# Patient Record
Sex: FEMALE
[De-identification: names, ages, dates, MRNs, and addresses within clinical notes are randomized per-mention and may not be internally consistent; named-entity substitution may affect disease eponyms.]

---

## 2018-08-23 PROBLEM — Z00.00 ENCOUNTER FOR PREVENTIVE HEALTH EXAMINATION: Status: ACTIVE | Noted: 2018-08-23

## 2018-09-20 ENCOUNTER — APPOINTMENT (OUTPATIENT)
Dept: ENDOCRINOLOGY | Facility: CLINIC | Age: 48
End: 2018-09-20
Payer: MEDICAID

## 2018-09-20 ENCOUNTER — TRANSCRIPTION ENCOUNTER (OUTPATIENT)
Age: 48
End: 2018-09-20

## 2018-09-20 VITALS
SYSTOLIC BLOOD PRESSURE: 151 MMHG | DIASTOLIC BLOOD PRESSURE: 85 MMHG | HEART RATE: 106 BPM | HEIGHT: 62 IN | BODY MASS INDEX: 34.78 KG/M2 | WEIGHT: 189 LBS

## 2018-09-20 DIAGNOSIS — Z87.891 PERSONAL HISTORY OF NICOTINE DEPENDENCE: ICD-10-CM

## 2018-09-20 DIAGNOSIS — Z83.3 FAMILY HISTORY OF DIABETES MELLITUS: ICD-10-CM

## 2018-09-20 DIAGNOSIS — Z82.0 FAMILY HISTORY OF EPILEPSY AND OTHER DISEASES OF THE NERVOUS SYSTEM: ICD-10-CM

## 2018-09-20 PROCEDURE — 99204 OFFICE O/P NEW MOD 45 MIN: CPT

## 2018-09-24 ENCOUNTER — LABORATORY RESULT (OUTPATIENT)
Age: 48
End: 2018-09-24

## 2018-09-26 ENCOUNTER — TRANSCRIPTION ENCOUNTER (OUTPATIENT)
Age: 48
End: 2018-09-26

## 2018-09-26 LAB
CORTIS SERPL-MCNC: 0.7 UG/DL
HBA1C MFR BLD HPLC: 4.6 %
THYROGLOB AB SERPL IA-ACNC: <1.8 IU/ML

## 2018-09-27 RX ORDER — DEXAMETHASONE 1 MG/1
1 TABLET ORAL
Qty: 1 | Refills: 0 | Status: DISCONTINUED | COMMUNITY
Start: 2018-09-20 | End: 2018-09-27

## 2018-10-25 ENCOUNTER — APPOINTMENT (OUTPATIENT)
Dept: ENDOCRINOLOGY | Facility: CLINIC | Age: 48
End: 2018-10-25

## 2018-12-20 ENCOUNTER — RX RENEWAL (OUTPATIENT)
Age: 48
End: 2018-12-20

## 2019-10-28 ENCOUNTER — TRANSCRIPTION ENCOUNTER (OUTPATIENT)
Age: 49
End: 2019-10-28

## 2019-12-19 ENCOUNTER — APPOINTMENT (OUTPATIENT)
Dept: ENDOCRINOLOGY | Facility: CLINIC | Age: 49
End: 2019-12-19
Payer: MEDICAID

## 2019-12-19 VITALS
WEIGHT: 198 LBS | HEIGHT: 62 IN | HEART RATE: 86 BPM | SYSTOLIC BLOOD PRESSURE: 141 MMHG | DIASTOLIC BLOOD PRESSURE: 90 MMHG | BODY MASS INDEX: 36.44 KG/M2

## 2019-12-19 PROCEDURE — 99214 OFFICE O/P EST MOD 30 MIN: CPT

## 2019-12-20 LAB
ALBUMIN SERPL ELPH-MCNC: 4.2 G/DL
ALP BLD-CCNC: 87 U/L
ALT SERPL-CCNC: 18 U/L
ANION GAP SERPL CALC-SCNC: 13 MMOL/L
AST SERPL-CCNC: 14 U/L
BILIRUB SERPL-MCNC: 0.5 MG/DL
BUN SERPL-MCNC: 18 MG/DL
CALCIUM SERPL-MCNC: 9.3 MG/DL
CHLORIDE SERPL-SCNC: 104 MMOL/L
CO2 SERPL-SCNC: 24 MMOL/L
CREAT SERPL-MCNC: 0.64 MG/DL
ESTIMATED AVERAGE GLUCOSE: 88 MG/DL
GLUCOSE SERPL-MCNC: 100 MG/DL
HBA1C MFR BLD HPLC: 4.7 %
POTASSIUM SERPL-SCNC: 4.3 MMOL/L
PROT SERPL-MCNC: 6.8 G/DL
SODIUM SERPL-SCNC: 141 MMOL/L
THYROGLOB AB SERPL-ACNC: <20 IU/ML
THYROGLOB SERPL-MCNC: <0.2 NG/ML
TSH SERPL-ACNC: 3.35 UIU/ML

## 2020-01-06 ENCOUNTER — RX RENEWAL (OUTPATIENT)
Age: 50
End: 2020-01-06

## 2020-01-06 NOTE — ASSESSMENT
[FreeTextEntry1] : Thyroid cancer. She had a history of nontoxic multinodular goiter, with biopsy of one lesion showing papillary thyroid cancer. She is status post total thyroidectomy in 2015. Pathology showed papillary thyroid carcinoma in one lobe and follicular thyroid carcinoma in the contralateral lobe. She did not need I-131 therapy. She states subsequent testing has demonstrated no evidence of disease. Her surgery and subsequent management prior to moving back to [facility] were at [facility]. She has been on Levoxyl 137 mcg daily.\par Request prior records\par Continue Levoxyl 137 mcg daily pending TSH for goal 0.5-2.0 uIU/mL\par Check thyroglobulin and antibodies\par Check neck ultrasound \par \par Elevated body mass index. Comorbidity of obstructive sleep apnea. We reviewed lifestyle modifications for weight loss. We discussed referral to nutrition. We discussed pharmacologic options for weight loss. She is amenable to bupropion. We discussed the risks and benefits of bupropion, including but not limited to nausea, headache, constipation, decrease in the seizure threshold. \par Lifestyle modification\par Referral to nutrition \par Start bupropion  mg daily for one week, then 300 mg daily\par \par Return to clinic in 2 months.\par \par CC:\par Dr. Mike Burnham, Fax [phone]

## 2020-01-06 NOTE — PHYSICAL EXAM
[Healthy Appearance] : healthy appearance [Alert] : alert [No Acute Distress] : no acute distress [Normal Sclera/Conjunctiva] : normal sclera/conjunctiva [No Neck Mass] : no neck mass was observed [Normal Oropharynx] : the oropharynx was normal [Supple] : the neck was supple [No LAD] : no lymphadenopathy [Well Healed Scar] : well healed scar [Normal Rate and Effort] : normal respiratory rhythm and effort [Clear to Auscultation] : lungs were clear to auscultation bilaterally [Normal Rate] : heart rate was normal  [Normal S1, S2] : normal S1 and S2 [Regular Rhythm] : with a regular rhythm [No Edema] : there was no peripheral edema [No Stigmata of Cushings Syndrome] : no stigmata of cushings syndrome [Acanthosis Nigricans] : acanthosis nigricans [Normal Insight/Judgement] : insight and judgment were intact [Kyphosis] : no kyphosis present [de-identified] : no palpable thyroid tissue [de-identified] : no moon facies, no supraclavicular fat pads

## 2020-01-06 NOTE — DATA REVIEWED
[FreeTextEntry1] : Recent laboratory testing from August reviewed, with unremarkable complete blood count and comprehensive metabolic panel, and TSH 0.123 uIU/mL

## 2020-01-06 NOTE — ADDENDUM
[FreeTextEntry1] : Recent test results as below; discussed with Ms. Rowe. Thyroglobulin undetectable with negative antibodies, which is reassuring. TSH 3.35 uIU/mL and recommend adjustment in thyroid regimen for goal TSH 0.5-2.0 uIU/mL. She is feeling fatigued and wants a trial of combination levothyroxine + liothyronine therapy. We will continue Levoxyl 137 mcg daily and add liothyronine 5 mcg daily in the afternoon. Other test results within range. We will repeat TSH prior to her next visit. 12/20/19\par \par Ms. Rowe has had migraine headaches with liothyronine generic and wonders if a switch to brand-name may help. I sent the prescription for Cytomel brand 5 mcg daily to her pharmacy. 1/06/20

## 2020-01-06 NOTE — HISTORY OF PRESENT ILLNESS
[FreeTextEntry1] : Ms. Rowe is a 49 year-old woman with a history of thyroid cancer status post total thyroidectomy in 2015, elevated body mass index with comorbidity of obstructive sleep apnea presenting for follow-up. I saw her for an initial visit in September 2018.\par \par Thyroid cancer.\par She had a history of nontoxic multinodular goiter, with biopsy of one lesion showing papillary thyroid cancer. She is status post total thyroidectomy in 2015. Pathology showed papillary thyroid carcinoma in one lobe and follicular thyroid carcinoma in the contralateral lobe. She did not need I-131 therapy. She states subsequent testing has demonstrated no evidence of disease. Her surgery and subsequent management prior to moving back to [place] were at [facility].\par She was initially on Levoxyl (brand-name) 175 mcg daily, decreased to 137 mcg daily after a few months. She has been on this dose for the past year.\par No history of radiation exposure.\par No family history of thyroid cancer.\par \par Interim History \par Laboratory results from last visit as below. Thyroglobulin undetectable with negative antibodies. Morning cortisol appropriately suppressed after dexamethasone. HbA1c within range.  \par She just returned from a year in [place]. She was caring for her disabled child, but is now moving here for services.\par She was on Celebrex, with subsequent weight gain.\par She was following the radical metabolism diet. She was also on a very low carbohydrate diet. \par She has been studying and raising three children. 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 \par She has neck pain. She has left foot pain and will have podiatric surgery. She was diagnosed with a kidney cyst and is having further imaging. \par Medical and surgical history, medications, allergies, social and family history reviewed and updated as needed.

## 2020-01-08 ENCOUNTER — RX RENEWAL (OUTPATIENT)
Age: 50
End: 2020-01-08

## 2020-02-12 ENCOUNTER — APPOINTMENT (OUTPATIENT)
Dept: ENDOCRINOLOGY | Facility: CLINIC | Age: 50
End: 2020-02-12
Payer: COMMERCIAL

## 2020-02-12 VITALS
SYSTOLIC BLOOD PRESSURE: 125 MMHG | HEART RATE: 86 BPM | BODY MASS INDEX: 35.48 KG/M2 | WEIGHT: 194 LBS | DIASTOLIC BLOOD PRESSURE: 80 MMHG

## 2020-02-12 PROCEDURE — 99214 OFFICE O/P EST MOD 30 MIN: CPT

## 2020-02-12 PROCEDURE — 97802 MEDICAL NUTRITION INDIV IN: CPT

## 2020-02-12 NOTE — HISTORY OF PRESENT ILLNESS
[FreeTextEntry1] : Ms. Rowe is a 49 year-old woman with a history of thyroid cancer status post total thyroidectomy in 2015, elevated body mass index with comorbidity of obstructive sleep apnea presenting for follow-up of her endocrine issues. I saw her for an initial visit in September 2018 and last in December 2019.\par \par Thyroid cancer.\par She had a history of nontoxic multinodular goiter, with biopsy of one lesion showing papillary thyroid cancer. She is status post total thyroidectomy in 2015. Pathology showed papillary thyroid carcinoma in one lobe and follicular thyroid carcinoma in the contralateral lobe. She did not need I-131 therapy. She states subsequent testing has demonstrated no evidence of disease. Her surgery and subsequent management prior to moving back to [place] were at [facility].\par She was on a regimen of Levoxyl 137 mcg daily. We started Cytomel 5 mcg daily in December 2019. She did not tolerate generic liothyronine. \par She is taking Levoxyl in the morning, on an empty stomach, with plain water, and waiting at least 30 minutes before eating. She is not taking calcium/iron/multivitamin. She is taking Cytomel in the early afternoon. \par No history of radiation exposure.\par No family history of thyroid cancer.\par \par Interim History \par Laboratory results from last visit as below. Thyroglobulin undetectable with negative antibodies. TSH 3.35 uIU/mL and recommended adjustment in thyroid regimen for goal TSH 0.5-2.0 uIU/mL. She wanted a trial of combination levothyroxine + liothyronine therapy. We continued Levoxyl 137 mcg daily and added Cytomel 5 mcg daily in the afternoon. Other test results within range. \par Last visit we discussed starting bupropion for weight loss. She did not start and wants to hold off on therapy for now. \par She was following the radical metabolism diet. She has increased physical activity. She is scheduled to see [employer]/nutrition today. \par She has been studying and raising three children. 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 She needs to travel back to [place] for 6-8 weeks.\par She has neck pain. She has left foot pain and will have podiatric surgery. She was diagnosed with a kidney cyst and is having further imaging. She has lost 4 pounds since last visit.\par Medical and surgical history, medications, allergies, social and family history reviewed and updated as needed.

## 2020-02-12 NOTE — ASSESSMENT
[FreeTextEntry1] : Thyroid cancer. She had a history of nontoxic multinodular goiter, with biopsy of one lesion showing papillary thyroid cancer. She is status post total thyroidectomy in 2015. Pathology showed papillary thyroid carcinoma in one lobe and follicular thyroid carcinoma in the contralateral lobe. She did not need I-131 therapy. She states subsequent testing has demonstrated no evidence of disease. Her surgery and subsequent management prior to moving back to [facility] were at [facility]. Thyroglobulin was undetectable with negative antibodies in December 2019, which is reassuring. We adjusted her thyroid hormone regimen last visit.\par Request prior records\par Continue Levoxyl 137 mcg + Cytomel 5 mcg daily pending TSH for goal 0.5-2.0 uIU/mL\par Check neck ultrasound\par \par Elevated body mass index. Comorbidity of obstructive sleep apnea. We reviewed lifestyle modifications for weight loss. We discussed referral to nutrition. We discussed pharmacologic options for weight loss. She may be amenable to bupropion if weight loss plateaus. We discussed the risks and benefits of bupropion, including but not limited to nausea, headache, constipation, decrease in the seizure threshold. \par Lifestyle modification\par Referral to nutrition \par \par Return to clinic in 3 months.\par \par CC:\par Dr. Mike Burnham, Fax [phone]

## 2020-02-12 NOTE — PHYSICAL EXAM
[Alert] : alert [No Acute Distress] : no acute distress [Healthy Appearance] : healthy appearance [Normal Sclera/Conjunctiva] : normal sclera/conjunctiva [Normal Oropharynx] : the oropharynx was normal [No Neck Mass] : no neck mass was observed [Supple] : the neck was supple [No LAD] : no lymphadenopathy [Well Healed Scar] : well healed scar [Normal Rate and Effort] : normal respiratory rhythm and effort [Clear to Auscultation] : lungs were clear to auscultation bilaterally [Normal Rate] : heart rate was normal  [Normal S1, S2] : normal S1 and S2 [Regular Rhythm] : with a regular rhythm [No Edema] : there was no peripheral edema [No Stigmata of Cushings Syndrome] : no stigmata of cushings syndrome [Normal Insight/Judgement] : insight and judgment were intact [Acanthosis Nigricans] : acanthosis nigricans [Kyphosis] : no kyphosis present [de-identified] : no palpable thyroid tissue [de-identified] : no moon facies, no supraclavicular fat pads

## 2020-08-17 ENCOUNTER — TRANSCRIPTION ENCOUNTER (OUTPATIENT)
Age: 50
End: 2020-08-17

## 2020-08-17 RX ORDER — LIOTHYRONINE SODIUM 5 UG/1
5 TABLET ORAL
Qty: 90 | Refills: 1 | Status: DISCONTINUED | COMMUNITY
Start: 2019-12-20 | End: 2020-08-17

## 2020-08-24 ENCOUNTER — TRANSCRIPTION ENCOUNTER (OUTPATIENT)
Age: 50
End: 2020-08-24

## 2021-02-15 ENCOUNTER — RX RENEWAL (OUTPATIENT)
Age: 51
End: 2021-02-15

## 2021-05-06 ENCOUNTER — RX RENEWAL (OUTPATIENT)
Age: 51
End: 2021-05-06

## 2021-06-18 ENCOUNTER — RX RENEWAL (OUTPATIENT)
Age: 51
End: 2021-06-18

## 2021-12-30 ENCOUNTER — APPOINTMENT (OUTPATIENT)
Dept: ENDOCRINOLOGY | Facility: CLINIC | Age: 51
End: 2021-12-30
Payer: MEDICAID

## 2021-12-30 PROCEDURE — 99214 OFFICE O/P EST MOD 30 MIN: CPT | Mod: 95

## 2021-12-30 RX ORDER — CHROMIUM 200 MCG
TABLET ORAL
Refills: 0 | Status: DISCONTINUED | COMMUNITY
End: 2021-12-30

## 2021-12-30 RX ORDER — BUPROPION HYDROCHLORIDE 300 MG/1
300 TABLET, EXTENDED RELEASE ORAL DAILY
Qty: 90 | Refills: 1 | Status: DISCONTINUED | COMMUNITY
Start: 2019-12-19 | End: 2021-12-30

## 2021-12-30 RX ORDER — BLUE-GREEN ALGAE 500 MG
CAPSULE ORAL
Refills: 0 | Status: ACTIVE | COMMUNITY

## 2021-12-30 RX ORDER — CHLORHEXIDINE GLUCONATE 4 %
LIQUID (ML) TOPICAL
Refills: 0 | Status: DISCONTINUED | COMMUNITY
End: 2021-12-30

## 2021-12-30 RX ORDER — MULTIVITAMIN
TABLET ORAL
Refills: 0 | Status: DISCONTINUED | COMMUNITY
End: 2021-12-30

## 2021-12-30 RX ORDER — CALCIUM CARBONATE/VITAMIN D3 600 MG-10
TABLET ORAL
Refills: 0 | Status: DISCONTINUED | COMMUNITY
End: 2021-12-30

## 2021-12-30 RX ORDER — PERPHENAZINE 8 MG
TABLET ORAL
Refills: 0 | Status: ACTIVE | COMMUNITY

## 2021-12-30 NOTE — ASSESSMENT
[FreeTextEntry1] : Thyroid cancer. Postoperative hypothyroidism. She had a history of nontoxic multinodular goiter, with biopsy of one lesion showing papillary thyroid cancer. She is status post total thyroidectomy in 2015. Pathology showed papillary thyroid carcinoma in one lobe and follicular thyroid carcinoma in the contralateral lobe. She did not need I-131 therapy. She states subsequent testing has demonstrated no evidence of disease. Her surgery and subsequent management prior to moving back to [facility] were at [facility]. Thyroglobulin was undetectable with negative antibodies in December 2019, which is reassuring. She has been clinically euthyroid.\par Continue Levoxyl 137 mcg daily pending TSH for goal 0.5-2.0 uIU/mL\par Check thyroglobulin and antibodies\par Review recent neck ultrasound results

## 2021-12-30 NOTE — HISTORY OF PRESENT ILLNESS
[Home] : at home, [unfilled] , at the time of the visit. [Medical Office: ([facility])___] : at the medical office located in  [Verbal consent obtained from patient] : the patient, [unfilled] [FreeTextEntry1] : Ms. Rowe is a 51 year-old woman with a history of thyroid cancer status post total thyroidectomy in 2015, elevated body mass index with comorbidity of obstructive sleep apnea presenting for follow-up of her endocrine issues. I saw her for an initial visit in September 2018 and last in February 2020.\par \par Thyroid cancer.\par She had a history of nontoxic multinodular goiter, with biopsy of one lesion showing papillary thyroid cancer. She is status post total thyroidectomy in 2015. Pathology showed papillary thyroid carcinoma in one lobe and follicular thyroid carcinoma in the contralateral lobe. She did not need I-131 therapy. She states subsequent testing has demonstrated no evidence of disease. Her surgery and subsequent management prior to moving back to [place] were at [facility].\par She has been taking Levoxyl 137 mcg daily. \par She is taking levothyroxine in the morning, on an empty stomach, with plain water, and waiting at least 30 minutes before eating. She is not taking calcium/iron/multivitamin. \par No history of radiation exposure.\par No family history of thyroid cancer.\par \par Interim History \par She has been in [place] during the pandemic and returned this week. Her daughter tested positive for COVID-19 infection on their return. \par She had a neck ultrasound in [place] and will send me the results. \par She has been studying and raising three children. \par She has had upper respiratory symptoms, although she has tested negative for COVID-19 infection. She is quarantining. She is taking Fusion hair tonic for hair loss.\par Medical and surgical history, medications, allergies, social and family history reviewed and updated as needed.

## 2022-02-17 ENCOUNTER — RX RENEWAL (OUTPATIENT)
Age: 52
End: 2022-02-17

## 2022-03-01 ENCOUNTER — APPOINTMENT (OUTPATIENT)
Dept: ENDOCRINOLOGY | Facility: CLINIC | Age: 52
End: 2022-03-01

## 2022-03-09 ENCOUNTER — APPOINTMENT (OUTPATIENT)
Dept: ENDOCRINOLOGY | Facility: CLINIC | Age: 52
End: 2022-03-09
Payer: MEDICAID

## 2022-03-09 PROCEDURE — 99214 OFFICE O/P EST MOD 30 MIN: CPT | Mod: 95

## 2022-03-09 NOTE — ASSESSMENT
[FreeTextEntry1] : Thyroid cancer. Postoperative hypothyroidism. She had a history of nontoxic multinodular goiter, with biopsy of one lesion showing papillary thyroid cancer. She is status post total thyroidectomy in 2015. Pathology showed papillary thyroid carcinoma in one lobe and follicular thyroid carcinoma in the contralateral lobe. She did not need I-131 therapy. Her surgery and subsequent management prior to moving back to [facility] were at [facility]. Thyroglobulin was undetectable in January 2022, which is reassuring. She has been biochemically euthyroid.\par Continue Levoxyl 137 mcg daily for goal TSH 0.5-2.0 uIU/mL\par Review neck ultrasound results\par \par CC:\par Dr. Lucille Major, Fax [phone]

## 2022-03-09 NOTE — HISTORY OF PRESENT ILLNESS
[FreeTextEntry1] : Ms. Rowe is a 51 year-old woman with a history of thyroid cancer status post total thyroidectomy in 2015, elevated body mass index with comorbidity of obstructive sleep apnea presenting for follow-up of her endocrine issues. I saw her for an initial visit in September 2018 and last in December 2021.\par \par Thyroid cancer.\par She had a history of nontoxic multinodular goiter, with biopsy of one lesion showing papillary thyroid cancer. She is status post total thyroidectomy in 2015. Pathology showed papillary thyroid carcinoma in one lobe and follicular thyroid carcinoma in the contralateral lobe. She did not need I-131 therapy. She states subsequent testing has demonstrated no evidence of disease. Her surgery and subsequent management prior to moving back to [facility] were at [facility].\par She has been taking Levoxyl 137 mcg daily. \par She is taking levothyroxine in the morning, on an empty stomach, with plain water, and waiting at least 30 minutes before eating. She is not taking calcium/iron/multivitamin. \par No history of radiation exposure.\par No family history of thyroid cancer.\par \par Interim History \par She has blood tests in January 2022 through her primary care provider. TSH 0.538 uIU/mL. Thyroglobulin undetectable; antibodies not sent but have been negative. She will send me results. \par She is scheduled for neck ultrasound. \par She has been raising three children. 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 Weight is overall stable from previous. Physical activity has been lower than previous. \par Medical and surgical history, medications, allergies, social and family history reviewed and updated as needed.  [Home] : at home, [unfilled] , at the time of the visit. [Medical Office: ([facility])___] : at the medical office located in  [Verbal consent obtained from patient] : the patient, [unfilled]

## 2022-03-09 NOTE — PHYSICAL EXAM
[Alert] : alert [Healthy Appearance] : healthy appearance [No Acute Distress] : no acute distress [Normal Sclera/Conjunctiva] : normal sclera/conjunctiva [Normal Hearing] : hearing was normal [Well Healed Scar] : well healed scar [No Respiratory Distress] : no respiratory distress [Normal Insight/Judgement] : insight and judgment were intact

## 2022-03-18 ENCOUNTER — TRANSCRIPTION ENCOUNTER (OUTPATIENT)
Age: 52
End: 2022-03-18

## 2022-04-21 ENCOUNTER — APPOINTMENT (OUTPATIENT)
Dept: ENDOCRINOLOGY | Facility: CLINIC | Age: 52
End: 2022-04-21
Payer: MEDICAID

## 2022-04-21 PROCEDURE — 99214 OFFICE O/P EST MOD 30 MIN: CPT | Mod: 95

## 2022-04-21 NOTE — DATA REVIEWED
[FreeTextEntry1] : Laboratories (March 25, 2022) reviewed and significant for: \par TSH 0.633 uIU/mL \par \par Laboratories (January 25, 2022) reviewed and significant for: \par TSH 0.538 uIU/mL \par Thyroglobulin undetectable\par \par Thyroid ultrasound (March 11, 2022) reviewed and significant for: \par Status post thyroidectomy. No suspicious findings.

## 2022-04-21 NOTE — HISTORY OF PRESENT ILLNESS
[Home] : at home, [unfilled] , at the time of the visit. [Medical Office: ([facility])___] : at the medical office located in  [Verbal consent obtained from patient] : the patient, [unfilled] [FreeTextEntry1] : Ms. Rowe is a 51 year-old woman with a history of thyroid cancer status post total thyroidectomy in 2015, elevated body mass index with comorbidity of obstructive sleep apnea presenting for follow-up of her endocrine issues. I saw her for an initial visit in September 2018 and last in March 2022.\par \par Thyroid cancer.\par She had a history of nontoxic multinodular goiter, with biopsy of one lesion showing papillary thyroid cancer. She is status post total thyroidectomy in 2015. Pathology showed papillary thyroid carcinoma in one lobe and follicular thyroid carcinoma in the contralateral lobe. She did not need I-131 therapy. She states subsequent testing has demonstrated no evidence of disease. Her surgery and subsequent management prior to moving back to [facility] were at [facility].\par Thyroglobulin undetectable in January 2022; antibodies not measured at that time but have been negative. Neck ultrasound without evidence of residual or recurrent disease in March 2022.\par She has been taking Levoxyl 137 mcg daily. \par She is taking levothyroxine in the morning, on an empty stomach, with plain water, and waiting at least 30 minutes before eating. She is not taking calcium/iron/multivitamin. \par No history of radiation exposure.\par No family history of thyroid cancer.\par \par Interim History \par Neck ultrasound without evidence of residual or recurrent disease in March.\par Recent TSH 0.633 uIU/mL in March.\par She had an endometrial biopsy performed this week due to postmenopausal bleeding. Her last menstrual period was in 2019 after taking evening primrose oil. She had questions about postmenopausal estrogen levels; her level was recently measured and detectable but at the lower end of the menopausal range.\par She has been raising three children. 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 \par Medical and surgical history, medications, allergies, social and family history reviewed and updated as needed.

## 2022-04-21 NOTE — ASSESSMENT
[FreeTextEntry1] : Thyroid cancer. Postoperative hypothyroidism. She had a history of nontoxic multinodular goiter, with biopsy of one lesion showing papillary thyroid cancer. She is status post total thyroidectomy in 2015. Pathology showed papillary thyroid carcinoma in one lobe and follicular thyroid carcinoma in the contralateral lobe. She did not need I-131 therapy. Her surgery and subsequent management prior to moving back to [facility] were at [facility]. Thyroglobulin undetectable in January 2022; antibodies not measured at that time but have been negative. Neck ultrasound without evidence of residual or recurrent disease in March 2022.\par Continue Levoxyl 137 mcg daily for goal TSH 0.5-2.0 uIU/mL\par Interval thyroglobulin with antibodies and neck ultrasound in March 2023\par \par CC:\par Dr. Lucille Major, Fax [phone]

## 2023-04-18 ENCOUNTER — APPOINTMENT (OUTPATIENT)
Dept: ENDOCRINOLOGY | Facility: CLINIC | Age: 53
End: 2023-04-18
Payer: MEDICAID

## 2023-04-18 VITALS
HEART RATE: 93 BPM | SYSTOLIC BLOOD PRESSURE: 121 MMHG | BODY MASS INDEX: 36.58 KG/M2 | DIASTOLIC BLOOD PRESSURE: 84 MMHG | WEIGHT: 200 LBS

## 2023-04-18 DIAGNOSIS — C73 MALIGNANT NEOPLASM OF THYROID GLAND: ICD-10-CM

## 2023-04-18 DIAGNOSIS — E89.0 POSTPROCEDURAL HYPOTHYROIDISM: ICD-10-CM

## 2023-04-18 PROCEDURE — 99214 OFFICE O/P EST MOD 30 MIN: CPT

## 2023-04-18 RX ORDER — NALTREXONE HYDROCHLORIDE 50 MG/1
TABLET, FILM COATED ORAL
Refills: 0 | Status: ACTIVE | COMMUNITY

## 2023-04-18 NOTE — PHYSICAL EXAM
[Alert] : alert [Healthy Appearance] : healthy appearance [No Acute Distress] : no acute distress [Normal Sclera/Conjunctiva] : normal sclera/conjunctiva [Normal Hearing] : hearing was normal [Well Healed Scar] : well healed scar [No Respiratory Distress] : no respiratory distress [Normal Insight/Judgement] : insight and judgment were intact [No Neck Mass] : no neck mass was observed [No LAD] : no lymphadenopathy [Supple] : the neck was supple [Thyroid Not Enlarged] : the thyroid was not enlarged [Kyphosis] : no kyphosis present [No Stigmata of Cushings Syndrome] : no stigmata of Cushings Syndrome [Normal Gait] : normal gait [Acanthosis Nigricans] : no acanthosis nigricans [de-identified] : no moon facies, no supraclavicular fat pads

## 2023-04-18 NOTE — DATA REVIEWED
[FreeTextEntry1] : Laboratories (February 28, 2023) reviewed and significant for: \par TSH 0.633 uIU/mL \par Thyroglobulin <10 ng/mL with negative antibodies\par \par Thyroid ultrasound (March 11, 2022) reviewed and significant for: \par Status post thyroidectomy. No suspicious findings.

## 2023-04-18 NOTE — HISTORY OF PRESENT ILLNESS
[FreeTextEntry1] : Ms. Rowe is a 52 year-old woman with a history of thyroid cancer status post total thyroidectomy in 2015, elevated body mass index with comorbidity of obstructive sleep apnea presenting for follow-up of her endocrine issues. I saw her for an initial visit in September 2018 and last in April 2022.\par \par Thyroid cancer.\par She had a history of nontoxic multinodular goiter, with biopsy of one lesion showing papillary thyroid cancer. She is status post total thyroidectomy in 2015. Pathology showed papillary thyroid carcinoma in one lobe and follicular thyroid carcinoma in the contralateral lobe. She did not need I-131 therapy. She states subsequent testing has demonstrated no evidence of disease. Her surgery and subsequent management prior to moving back to [facility] were at [facility].\par Thyroglobulin undetectable in January 2022; antibodies not measured at that time but have been negative. Neck ultrasound without evidence of residual or recurrent disease in March 2022.\par She has been taking Levoxyl 137 mcg daily. \par She is taking levothyroxine in the morning, on an empty stomach, with plain water, and waiting at least 30 minutes before eating. She is not taking calcium/iron/multivitamin. \par No history of radiation exposure.\par No family history of thyroid cancer.\par \par Interim History \par She has been taking Levoxyl 137 mcg daily. Recent TSH 0.370 uIU/mL. Thyroglobulin undetectable with negative antibodies. \par Her endometrial biopsy was negative. She has noted some discomfort since the time of her procedure which is now improving. \par She has noted improvement in multiple symptoms, including claustrophobia, with naltrexone 1.5 mg daily. \par She has been raising her three children. Her daughter was accepted to college for next year. Her eldest son is involved in multiple advanced placement classes and extracurricular activities. Her youngest son has special needs and may go live with her ex-. \par She has not had time to take care of herself due to caring for her family. She hopes to change her diet and increase physical activity in the next year. \par Medical and surgical history, medications, allergies, social and family history reviewed and updated as needed.

## 2023-04-18 NOTE — ASSESSMENT
[FreeTextEntry1] : Thyroid cancer. Postoperative hypothyroidism. She had a history of nontoxic multinodular goiter, with biopsy of one lesion showing papillary thyroid cancer. She is status post total thyroidectomy in 2015. Pathology showed papillary thyroid carcinoma in one lobe and follicular thyroid carcinoma in the contralateral lobe. She did not need I-131 therapy. Her surgery and subsequent management prior to moving back to [facility] were at [facility]. Thyroglobulin undetectable in February 2023 with negative antibodies. Neck ultrasound without evidence of residual or recurrent disease in March 2022; we will defer imaging this year.\par Continue Levoxyl 137 mcg daily for goal TSH 0.5-2.0 uIU/mL; can continue current regimen for now\par \par CC:\par Dr. Lucille Major, Fax [phone]

## 2024-06-17 ENCOUNTER — RX RENEWAL (OUTPATIENT)
Age: 54
End: 2024-06-17

## 2024-06-17 RX ORDER — LEVOTHYROXINE SODIUM 137 UG/1
137 TABLET ORAL
Qty: 90 | Refills: 0 | Status: ACTIVE | COMMUNITY
Start: 2018-12-20 | End: 1900-01-01

## 2025-03-28 ENCOUNTER — APPOINTMENT (OUTPATIENT)
Dept: ENDOCRINOLOGY | Facility: CLINIC | Age: 55
End: 2025-03-28
Payer: MEDICAID

## 2025-03-28 VITALS
HEART RATE: 110 BPM | SYSTOLIC BLOOD PRESSURE: 153 MMHG | BODY MASS INDEX: 37.91 KG/M2 | WEIGHT: 206 LBS | DIASTOLIC BLOOD PRESSURE: 86 MMHG | HEIGHT: 62 IN

## 2025-03-28 DIAGNOSIS — K20.0 EOSINOPHILIC ESOPHAGITIS: ICD-10-CM

## 2025-03-28 DIAGNOSIS — Z00.00 ENCOUNTER FOR GENERAL ADULT MEDICAL EXAMINATION W/OUT ABNORMAL FINDINGS: ICD-10-CM

## 2025-03-28 DIAGNOSIS — C73 MALIGNANT NEOPLASM OF THYROID GLAND: ICD-10-CM

## 2025-03-28 DIAGNOSIS — E66.812 OBESITY, CLASS 2: ICD-10-CM

## 2025-03-28 DIAGNOSIS — E89.0 POSTPROCEDURAL HYPOTHYROIDISM: ICD-10-CM

## 2025-03-28 DIAGNOSIS — Z13.1 ENCOUNTER FOR SCREENING FOR DIABETES MELLITUS: ICD-10-CM

## 2025-03-28 DIAGNOSIS — Z13.220 ENCOUNTER FOR SCREENING FOR LIPOID DISORDERS: ICD-10-CM

## 2025-03-28 PROCEDURE — G2211 COMPLEX E/M VISIT ADD ON: CPT | Mod: NC

## 2025-03-28 PROCEDURE — 99214 OFFICE O/P EST MOD 30 MIN: CPT

## 2025-03-30 ENCOUNTER — NON-APPOINTMENT (OUTPATIENT)
Age: 55
End: 2025-03-30

## 2025-03-31 LAB
ALBUMIN SERPL ELPH-MCNC: 4.5 G/DL
ALP BLD-CCNC: 124 U/L
ALT SERPL-CCNC: 27 U/L
ANION GAP SERPL CALC-SCNC: 14 MMOL/L
AST SERPL-CCNC: 17 U/L
BASOPHILS # BLD AUTO: 0.09 K/UL
BASOPHILS NFR BLD AUTO: 0.8 %
BILIRUB SERPL-MCNC: 0.6 MG/DL
BUN SERPL-MCNC: 15 MG/DL
CALCIUM SERPL-MCNC: 9.9 MG/DL
CHLORIDE SERPL-SCNC: 103 MMOL/L
CHOLEST SERPL-MCNC: 243 MG/DL
CO2 SERPL-SCNC: 24 MMOL/L
CREAT SERPL-MCNC: 0.73 MG/DL
EGFRCR SERPLBLD CKD-EPI 2021: 97 ML/MIN/1.73M2
EOSINOPHIL # BLD AUTO: 0.23 K/UL
EOSINOPHIL NFR BLD AUTO: 2.1 %
ESTIMATED AVERAGE GLUCOSE: 114 MG/DL
GLUCOSE SERPL-MCNC: 164 MG/DL
HBA1C MFR BLD HPLC: 5.6 %
HCT VFR BLD CALC: 43.3 %
HDLC SERPL-MCNC: 37 MG/DL
HGB BLD-MCNC: 14.8 G/DL
IMM GRANULOCYTES NFR BLD AUTO: 0.4 %
LDLC SERPL-MCNC: 119 MG/DL
LYMPHOCYTES # BLD AUTO: 3.92 K/UL
LYMPHOCYTES NFR BLD AUTO: 35.2 %
MAN DIFF?: NORMAL
MCHC RBC-ENTMCNC: 29.5 PG
MCHC RBC-ENTMCNC: 34.2 G/DL
MCV RBC AUTO: 86.3 FL
MONOCYTES # BLD AUTO: 0.63 K/UL
MONOCYTES NFR BLD AUTO: 5.7 %
NEUTROPHILS # BLD AUTO: 6.22 K/UL
NEUTROPHILS NFR BLD AUTO: 55.8 %
NONHDLC SERPL-MCNC: 206 MG/DL
PLATELET # BLD AUTO: 396 K/UL
POTASSIUM SERPL-SCNC: 4.2 MMOL/L
PROT SERPL-MCNC: 7 G/DL
RBC # BLD: 5.02 M/UL
RBC # FLD: 14 %
SODIUM SERPL-SCNC: 142 MMOL/L
THYROGLOB AB SERPL-ACNC: 16.4 IU/ML
THYROGLOB SERPL-MCNC: <0.1 NG/ML
TRIGL SERPL-MCNC: 492 MG/DL
TSH SERPL-ACNC: 1.98 UIU/ML
WBC # FLD AUTO: 11.14 K/UL